# Patient Record
Sex: FEMALE | Race: WHITE
[De-identification: names, ages, dates, MRNs, and addresses within clinical notes are randomized per-mention and may not be internally consistent; named-entity substitution may affect disease eponyms.]

---

## 2018-02-07 ENCOUNTER — HOSPITAL ENCOUNTER (EMERGENCY)
Dept: HOSPITAL 25 - UCEAST | Age: 53
Discharge: HOME | End: 2018-02-07
Payer: COMMERCIAL

## 2018-02-07 VITALS — SYSTOLIC BLOOD PRESSURE: 117 MMHG | DIASTOLIC BLOOD PRESSURE: 80 MMHG

## 2018-02-07 DIAGNOSIS — Z20.828: ICD-10-CM

## 2018-02-07 DIAGNOSIS — Z87.891: ICD-10-CM

## 2018-02-07 DIAGNOSIS — J32.9: Primary | ICD-10-CM

## 2018-02-07 PROCEDURE — 87502 INFLUENZA DNA AMP PROBE: CPT

## 2018-02-07 PROCEDURE — G0463 HOSPITAL OUTPT CLINIC VISIT: HCPCS

## 2018-02-07 PROCEDURE — 99212 OFFICE O/P EST SF 10 MIN: CPT

## 2018-02-07 NOTE — UC
Chan MOREAU Angela, scribed for Mariana Wheat MD on 02/07/18 at 1328 .





FLU HPI





- HPI Summary


HPI Summary: 





This pt is a 54 y/o female presenting to [facility] c/o nonproductive cough since 

last night. This morning she states her nonproductive cough was worse. 

Associated symptoms today include ear ache, sore throat, headache, rhinorrhea. 

She notes her nasal discharge is clear. Denies fever, chills, nausea, vomiting, 

diarrhea. She took 2 Motrin this morning with mild relief. Pt with recent air 

travel with sick contacts


Pt reports she travels a lot in a plane. She had sick contacts when traveling 

by plane 2 days ago, a person who was just getting over the flu.


She has had the flu vaccine this year. 


Pt is currently on Synthroid s/p right lobe thyroid removal for thyroid CA.








Patients medication reviewed this visit.





- History of Current Complaint


Stated Complaint: COUGH,EAR PAIN


Time Seen by Provider: 02/07/18 13:14


Hx Obtained From: Patient


Hx Last Menstrual Period: NA


Pregnant?: No


Onset/Duration: Gradual Onset, Lasting Hours, Still Present


Severity Currently: Moderate


Pain Intensity: 4 - ear ache


Pain Scale Used: 0-10 Numeric


Associated Signs & Symptoms: Positive: Cough, Sore Throat, Headache.  Negative: 

Fever, Vomiting, Diarrhea





- Allergy/Home Medications


Allergies/Adverse Reactions: 


 Allergies











Allergy/AdvReac Type Severity Reaction Status Date / Time


 


MS Cefaclor [From Ceclor] Allergy Severe Rash Verified 02/07/18 13:17


 


MS Erythromycin AdvReac Severe See Comment Verified 02/07/18 13:17





[Erythromycin]     


 


MS Sulfa Drugs [Sulfa Drugs] AdvReac Severe See Comment Verified 06/20/16 09:51














PMH/Surg Hx/FS Hx/Imm Hx


Previously Healthy: Yes


Endocrine History: Thyroid Disease - thyroid CA


Other Endocrine History: DENIES: diabetes


Other Cancer History: Thyroid cancer





- Surgical History


Surgical History: Yes


Surgery Procedure, Year, and Place: 2006; Right lobe thyroid removed, 2 c 

sections, left acl repair 1993, [id number],.  c section 1997 and 1999





- Family History


Known Family History: Positive: Hypertension





- Social History


Occupation: Employed Full-time


Lives: With Family


Alcohol Use: Weekly - 2-3 drinks


Substance Use Type: None


Smoking Status (MU): Former Smoker


Length of Time of Smoking/Using Tobacco: 2yrs


When Did the Patient Quit Smoking/Using Tobacco: 30 years





- Immunization History


Most Recent Influenza Vaccination: 2015


Most Recent Tetanus Shot: up to date





Review of Systems


Constitutional: Negative


Skin: Negative


Eyes: Negative


ENT: Sore Throat, Ear Ache, Nasal Discharge


Respiratory: Cough


Cardiovascular: Negative


Gastrointestinal: Negative


Genitourinary: Negative


Motor: Negative


Neurovascular: Negative


Musculoskeletal: Negative


Neurological: Headache


Psychological: Negative


All Other Systems Reviewed And Are Negative: Yes





Physical Exam


Triage Information Reviewed: Yes


Appearance: Well-Appearing, No Pain Distress, Well-Nourished


Vital Signs: 


 Initial Vital Signs











Temp  99.1 F   02/07/18 13:12


 


Pulse  72   02/07/18 13:12


 


Resp  18   02/07/18 13:12


 


BP  117/80   02/07/18 13:12


 


Pulse Ox  98   02/07/18 13:12











Vital Signs Reviewed: Yes


Eye Exam: Normal


Eyes: Positive: Conjunctiva Clear


ENT: Positive: Hearing grossly normal, Other - Pt had cerumen in right ear - 

manually removed + fluid b/l TM  no erythema, no bulging turbinates inflammed + 

PND No erythema, no exudate uvula midline


Dental Exam: Normal


Neck exam: Normal


Neck: Positive: Supple, Nontender, No Lymphadenopathy


Respiratory Exam: Normal


Respiratory: Positive: Chest non-tender, Lungs clear, Normal breath sounds, No 

respiratory distress, No accessory muscle use


Cardiovascular Exam: Normal


Cardiovascular: Positive: RRR, No Murmur


Abdominal Exam: Normal


Abdomen Description: Positive: Nontender, No Organomegaly, Soft


Bowel Sounds: Positive: Present


Musculoskeletal Exam: Normal


Neurological Exam: Normal


Neurological: Positive: Alert


Psychological Exam: Normal





Re-Evaluation





- Re-Evaluation


  ** First Eval


Re-Evaluation Time: 14:02


Comment: flu neg.  d/w pt.  will Rx flonase.  if sx persisted > 48 hours, will 

start Amox for sinuses and ears.  diflucan a needed for abx associated yeast.  

secretion precautions.  humidify air.  pt comfortable and in agreement with plan





Flu Course/Dx





- Course


Course Of Treatment: Pt with chills, cough, PND and fatigue x 24 hours after 

recent travel.  Pt took Motrin this am for aches.  Pt with boggy turbinates and 

fluid in ears





- Differential Dx/Diagnosis


Differential Diagnosis/HQI/PQRI: Influenza


Provider Diagnoses: sinusitis





Discharge





- Discharge Plan


Condition: Stable


Disposition: HOME


Prescriptions: 


Amoxicillin PO (*) [Amoxicillin 875 MG (*)] 875 mg PO BID #20 tab


Fluconazole [Diflucan 150 MG (NF)] 150 mg PO ONCE PRN #1 tab


 PRN Reason: yeast infection


Fluticasone NASAL SPRAY 50MCG* [Flonase NASAL SPRAY 50MCG*] 2 spray BOTH NARES 

DAILY #1 btl


Patient Education Materials:  Rhinosinusitis (ED)


Referrals: 


No Primary Care Phys,NOPCP [Primary Care Provider] - 


Additional Instructions: 


- Stay well hydrated. Drink plenty of non-alcoholic, non-caffinated beverages.


- Alternate ibuprofen (Advil, Motrin) 600mg and Tylenol every 3 hours for pain 

or fever.  Take with food. Do NOT take for more than 4-5 days. 


- These infections are spread by secretions - do NOT share eating or drinking 

utensils - clean items you share with other people such as cell phones, 

computer mouse, TV remote, computer tablets,  etc. Once you start feeling better

, change your pillowcase and your toothbrush


- get plenty of restful sleep


- use nasal spray as prescribed. 


-if you continue to have symptoms after 48 hours, okay to take antibiotics as 

prescribed


- you have a prescription for Diflucan - this can be used to treat a yeast 

infection if you develop following the use of antibiotics


- humidify the air in the room where you sleep - boil water, run a hot steam 

shower, vaporizer, cups of water by heat register


- okay to take over the counter decongestant and cough medication


- contact your doctor or return with questions or concerns





The documentation as recorded by the Chan wang Angela accurately reflects 

the service I personally performed and the decisions made by me, Mariana Wheat MD.

## 2018-02-12 ENCOUNTER — HOSPITAL ENCOUNTER (EMERGENCY)
Dept: HOSPITAL 25 - UCEAST | Age: 53
Discharge: HOME | End: 2018-02-12
Payer: COMMERCIAL

## 2018-02-12 VITALS — SYSTOLIC BLOOD PRESSURE: 136 MMHG | DIASTOLIC BLOOD PRESSURE: 95 MMHG

## 2018-02-12 DIAGNOSIS — R05: ICD-10-CM
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
## 2018-02-12 DIAGNOSIS — Z87.891: ICD-10-CM

## 2018-02-12 DIAGNOSIS — S29.019A: Primary | ICD-10-CM

## 2018-02-12 DIAGNOSIS — E03.9: ICD-10-CM
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
## 2018-02-12 DIAGNOSIS — Z88.2: ICD-10-CM

## 2018-02-12 DIAGNOSIS — Z88.1: ICD-10-CM

## 2018-02-12 PROCEDURE — 99212 OFFICE O/P EST SF 10 MIN: CPT

## 2018-02-12 PROCEDURE — G0463 HOSPITAL OUTPT CLINIC VISIT: HCPCS

## 2018-02-12 NOTE — RAD
HISTORY: Left anterior pain, coughing



COMPARISONS: None



VIEWS: 4,  Frontal view of the chest with frontal and oblique views of the left hemithorax



FINDINGS: There is no displaced rib fracture or pneumothorax. The visualized lungs are

clear. 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



IMPRESSION: 

NO DISPLACED RIB FRACTURE OR PNEUMOTHORAX.

## 2018-02-12 NOTE — UC
Respiratory Complaint HPI





- HPI Summary


HPI Summary: 


Pt presents with left rib pain and persistent dry cough. She tells me that she 

was seen last week for flu like symptoms and told she had a negative flu test, 

but later that day and the following days had fatigue, fever, body aches, and a 

dry cough - she thinks she had the flu. Most of her symptoms have improved, but 

she still has a persistent dry cough. During one of her coughing spells she 

felt a pull in her left side that is bothering her with movement now. Denies 

fever, chills, SOB, chest pain, abdominal pain, n/v/d/c.











- History of Current Complaint


Chief Complaint: UCRespiratory


Stated Complaint: FLU SYMPTOMS CONGESTION COUGH


Time Seen by Provider: 02/12/18 14:53


Hx Obtained From: Patient


Hx Last Menstrual Period: NA


Onset/Duration: Sudden Onset


Timing: Constant


Severity Initially: Severe


Severity Currently: Severe


Pain Intensity: 10


Pain Scale Used: 0-10 Numeric


Character: Cough: Nonproductive





- Allergies/Home Medications


Allergies/Adverse Reactions: 


 Allergies











Allergy/AdvReac Type Severity Reaction Status Date / Time


 


MS Cefaclor [From Ceclor] Allergy Severe Rash Verified 02/12/18 14:50


 


MS Erythromycin AdvReac Severe See Comment Verified 02/12/18 14:50





[Erythromycin]     


 


MS Sulfa Drugs [Sulfa Drugs] AdvReac Severe See Comment Verified 02/12/18 14:50














PMH/Surg Hx/FS Hx/Imm Hx


Previously Healthy: Yes


Endocrine History: Hypothyroidism





- Surgical History


Surgical History: Yes


Surgery Procedure, Year, and Place: 2006; Right lobe thyroid removed, 2 c 

sections, left acl repair 1993, [id number],.  c section 1997 and 1999





- Family History


Known Family History: Positive: Hypertension





- Social History


Occupation: Employed Full-time


Lives: With Family


Alcohol Use: Weekly


Substance Use Type: None


Smoking Status (MU): Former Smoker


Length of Time of Smoking/Using Tobacco: 2yrs


When Did the Patient Quit Smoking/Using Tobacco: 30 years





- Immunization History


Most Recent Influenza Vaccination: 2015


Most Recent Tetanus Shot: up to date





Review of Systems


Constitutional: Negative


Skin: Negative


Eyes: Negative


ENT: Negative


Respiratory: Cough


Cardiovascular: Negative


Gastrointestinal: Negative


Neurological: Negative


Psychological: Negative


All Other Systems Reviewed And Are Negative: Yes





Physical Exam


Triage Information Reviewed: Yes


Appearance: Well-Appearing, No Pain Distress, Well-Nourished


Vital Signs: 


 Initial Vital Signs











Temp  98.9 F   02/12/18 14:44


 


Pulse  43   02/12/18 14:44


 


Resp  18   02/12/18 14:44


 


BP  136/95   02/12/18 14:44


 


Pulse Ox  99   02/12/18 14:44











Vital Signs Reviewed: Yes


Eyes: Positive: Conjunctiva Clear.  Negative: Conjunctiva Inflamed, Discharge


ENT: Positive: Hearing grossly normal, Pharynx normal, TMs normal, Uvula 

midline.  Negative: Pharyngeal erythema, Nasal congestion, Nasal drainage, TM 

bulging, TM dull, TM red, Tonsillar swelling, Tonsillar exudate, Hoarse voice, 

Sinus tenderness


Neck: Positive: Supple, Nontender, No Lymphadenopathy


Respiratory: Positive: Lungs clear, Normal breath sounds, No respiratory 

distress, No accessory muscle use


Cardiovascular: Positive: RRR, No Murmur, Pulses Normal


Neurological: Positive: Alert


Psychological: Positive: Age Appropriate Behavior


Skin: Negative: rashes





[facility] Diagnostic Evaluation





- Laboratory


O2 Sat by Pulse Oximetry: 99





Respiratory Course/Dx





- Course


Course Of Treatment: Suspect muscle strain from coughing.  Ribs with CXR: 

IMPRESSION: NO DISPLACED RIB FRACTURE OR PNEUMOTHORAX.





- Differential Dx/Diagnosis


Provider Diagnoses: Muscle strain.  Cough





Discharge





- Discharge Plan


Condition: Stable


Disposition: HOME


Prescriptions: 


Benzonatate CAP* [Tessalon 100 MG CAP*] 100 mg PO TID PRN #21 cap


 PRN Reason: Cough


guaiFENesin/CODIEN 100MG-10MG* [Robitussin AC 100Mg-10Mg*] 5 ml PO BEDTIME PRN #

35 ml MDD 5mL


 PRN Reason: Cough


predniSONE TAB* [Deltasone TAB*] 40 mg PO DAILY #10 tab


Patient Education Materials:  Acute Bronchitis (ED)


Referrals: 


No Primary Care Phys,NOPCP [Primary Care Provider] - 


Additional Instructions: 


If you develop a fever, shortness of breath, chest pain, new or worsening 

symptoms - please call your PCP or go to the ED.


 





Your blood pressure was high at todays visit. Please see your primary provider 

within 4 weeks for recheck and re-evaluation.